# Patient Record
Sex: MALE
[De-identification: names, ages, dates, MRNs, and addresses within clinical notes are randomized per-mention and may not be internally consistent; named-entity substitution may affect disease eponyms.]

---

## 2020-12-10 ENCOUNTER — APPOINTMENT (OUTPATIENT)
Dept: ENDOCRINOLOGY | Facility: CLINIC | Age: 65
End: 2020-12-10

## 2022-04-12 ENCOUNTER — APPOINTMENT (OUTPATIENT)
Dept: UROLOGY | Facility: CLINIC | Age: 67
End: 2022-04-12
Payer: MEDICARE

## 2022-04-12 VITALS
HEART RATE: 92 BPM | SYSTOLIC BLOOD PRESSURE: 107 MMHG | OXYGEN SATURATION: 96 % | TEMPERATURE: 98 F | DIASTOLIC BLOOD PRESSURE: 73 MMHG

## 2022-04-12 DIAGNOSIS — Z95.5 PRESENCE OF CORONARY ANGIOPLASTY IMPLANT AND GRAFT: ICD-10-CM

## 2022-04-12 PROCEDURE — 99204 OFFICE O/P NEW MOD 45 MIN: CPT

## 2022-04-12 RX ORDER — METOPROLOL TARTRATE 75 MG/1
TABLET, FILM COATED ORAL
Refills: 0 | Status: ACTIVE | COMMUNITY

## 2022-04-12 RX ORDER — CLOPIDOGREL 75 MG/1
TABLET, FILM COATED ORAL
Refills: 0 | Status: ACTIVE | COMMUNITY

## 2022-04-12 RX ORDER — ROSUVASTATIN CALCIUM 10 MG/1
10 TABLET, FILM COATED ORAL
Refills: 0 | Status: ACTIVE | COMMUNITY
Start: 2022-04-12

## 2022-04-21 PROBLEM — Z95.5 H/O HEART ARTERY STENT: Status: ACTIVE | Noted: 2022-04-12

## 2022-04-21 LAB
APPEARANCE: CLEAR
BACTERIA UR CULT: NORMAL
BACTERIA: NEGATIVE
BILIRUBIN URINE: NEGATIVE
BLOOD URINE: NEGATIVE
COLOR: NORMAL
GLUCOSE QUALITATIVE U: ABNORMAL
HYALINE CASTS: 0 /LPF
KETONES URINE: NEGATIVE
LEUKOCYTE ESTERASE URINE: NEGATIVE
MICROSCOPIC-UA: NORMAL
NITRITE URINE: NEGATIVE
PH URINE: 6
PROTEIN URINE: ABNORMAL
PSA SERPL-MCNC: 1.65 NG/ML
RED BLOOD CELLS URINE: 0 /HPF
SPECIFIC GRAVITY URINE: 1.03
SQUAMOUS EPITHELIAL CELLS: 0 /HPF
UROBILINOGEN URINE: NORMAL
WHITE BLOOD CELLS URINE: 0 /HPF

## 2022-04-21 NOTE — ADDENDUM
[FreeTextEntry1] : I, Naveed Merrill, solely acted as scribe for Dr. Wolfgang Carrillo on 04/12/2022.

## 2022-04-21 NOTE — LETTER BODY
[Dear  ___] : Dear  [unfilled], [Consult Letter:] : I had the pleasure of evaluating your patient, [unfilled]. [Please see my note below.] : Please see my note below. [Consult Closing:] : Thank you very much for allowing me to participate in the care of this patient.  If you have any questions, please do not hesitate to contact me. [Sincerely,] : Sincerely, [FreeTextEntry1] : see note\par await labs\par \par trial of nystatin [FreeTextEntry3] : Wolfgang Carrillo MD FACS\par \par Attending Urologist-[facility]\par Chief-Urology Division [facility]\par Associate Clinical Professor-[employer] [facility] [employer] at [facility]\par \par

## 2022-04-21 NOTE — END OF VISIT
[FreeTextEntry3] : Medical record entries made by the scribe today, were at my direction and personally dictated to them by me, Dr. Wolfgang Carrillo on 04/12/2022. I have reviewed the chart and agree that the record accurately reflects my personal performance of the history, physical exam, assessment, and plan.

## 2022-04-21 NOTE — PHYSICAL EXAM
[General Appearance - Well Developed] : well developed [General Appearance - Well Nourished] : well nourished [Normal Appearance] : normal appearance [Well Groomed] : well groomed [General Appearance - In No Acute Distress] : no acute distress [Edema] : no peripheral edema [Respiration, Rhythm And Depth] : normal respiratory rhythm and effort [Exaggerated Use Of Accessory Muscles For Inspiration] : no accessory muscle use [Abdomen Soft] : soft [Abdomen Tenderness] : non-tender [Costovertebral Angle Tenderness] : no ~M costovertebral angle tenderness [Urethral Meatus] : meatus normal [Scrotum] : the scrotum was normal [No Prostate Nodules] : no prostate nodules [Testes Mass (___cm)] : there were no testicular masses [Normal Station and Gait] : the gait and station were normal for the patient's age [] : no rash [No Focal Deficits] : no focal deficits [Oriented To Time, Place, And Person] : oriented to person, place, and time [Affect] : the affect was normal [Mood] : the mood was normal [Not Anxious] : not anxious [No Palpable Adenopathy] : no palpable adenopathy [Epididymis] : the epididymides were normal [Testes Tenderness] : no tenderness of the testes [Anus Abnormality] : the anus and perineum were normal [Rectal Exam - Rectum] : digital rectal exam was normal [Prostate Tenderness] : the prostate was not tender [Prostate Size ___ (0-4)] : prostate size [unfilled] (scale: 0-4) [FreeTextEntry1] : balanitis

## 2022-04-21 NOTE — HISTORY OF PRESENT ILLNESS
[FreeTextEntry1] : 66 year old male presents today for c/o frequency, urgency, with mild urge incontinence, nocturia 4-5x, incomplete emptying and weak stream \par \par also c/o of a penile rash that causes his penis to appear bright red with some "skin falling off it" \par \par IPSS today: 17 \par \par denies dysuria and hematuria

## 2022-04-21 NOTE — ASSESSMENT
[FreeTextEntry1] : 66 year old male with LUTS, groin rash, and balanitis \par \par PVR today: 2 cc \par \par UA micro, culture, and PSA sent today\par \par to start on nystatin cream \par \par f/u 6-8 weeks for symptom reassessment \par

## 2022-04-21 NOTE — REVIEW OF SYSTEMS
[Eyesight Problems] : eyesight problems [Dry Eyes] : dryness of the eyes [Urine Infection (bladder/kidney)] : bladder/kidney infection [Strong urge to urinate] : strong urge to urinate [Bladder pressure] : experiences bladder pressure [Bladder fullness after urinating] : bladder fullness after urinating [Skin Lesions] : skin lesion [Skin Wound] : skin wound [Itching] : itching [Depression] : depression [Muscle Weakness] : muscle weakness [Negative] : Heme/Lymph [see HPI] : see HPI [FreeTextEntry3] : Strain urge to urinate causing leak of urine , dribble of urine

## 2022-04-22 ENCOUNTER — NON-APPOINTMENT (OUTPATIENT)
Age: 67
End: 2022-04-22

## 2022-06-06 ENCOUNTER — APPOINTMENT (OUTPATIENT)
Dept: UROLOGY | Facility: CLINIC | Age: 67
End: 2022-06-06
Payer: MEDICARE

## 2022-06-06 VITALS
SYSTOLIC BLOOD PRESSURE: 131 MMHG | TEMPERATURE: 97.8 F | HEART RATE: 71 BPM | DIASTOLIC BLOOD PRESSURE: 66 MMHG | OXYGEN SATURATION: 98 % | WEIGHT: 210 LBS

## 2022-06-06 DIAGNOSIS — N48.1 BALANITIS: ICD-10-CM

## 2022-06-06 DIAGNOSIS — R39.9 UNSPECIFIED SYMPTOMS AND SIGNS INVOLVING THE GENITOURINARY SYSTEM: ICD-10-CM

## 2022-06-06 PROCEDURE — 99213 OFFICE O/P EST LOW 20 MIN: CPT

## 2022-06-06 RX ORDER — NYSTATIN 100000 [USP'U]/G
100000 CREAM TOPICAL 3 TIMES DAILY
Qty: 1 | Refills: 3 | Status: ACTIVE | COMMUNITY
Start: 2022-04-12 | End: 1900-01-01

## 2022-06-06 RX ORDER — DULAGLUTIDE 0.75 MG/.5ML
0.75 INJECTION, SOLUTION SUBCUTANEOUS
Qty: 2 | Refills: 0 | Status: ACTIVE | COMMUNITY
Start: 2022-04-20

## 2022-06-06 NOTE — PHYSICAL EXAM
[General Appearance - Well Developed] : well developed [General Appearance - Well Nourished] : well nourished [Normal Appearance] : normal appearance [Well Groomed] : well groomed [General Appearance - In No Acute Distress] : no acute distress [Abdomen Soft] : soft [Abdomen Tenderness] : non-tender [Costovertebral Angle Tenderness] : no ~M costovertebral angle tenderness [Penis Abnormality] : normal uncircumcised penis [Scrotum] : the scrotum was normal [Epididymis] : the epididymides were normal [Testes Tenderness] : no tenderness of the testes [Testes Mass (___cm)] : there were no testicular masses [FreeTextEntry1] : mild minimal residual balanitis\par mild min residual balanitis

## 2022-06-06 NOTE — LETTER BODY
[Dear  ___] : Dear  [unfilled], [Courtesy Letter:] : I had the pleasure of seeing your patient, [unfilled], in my office today. [Please see my note below.] : Please see my note below. [Sincerely,] : Sincerely, [FreeTextEntry1] : see below [FreeTextEntry3] : Wolfgang Carrillo MD FACS\par \par Attending Urologist-[facility]\par Chief-Urology Division [facility]\par Associate Clinical Professor-[facility] [employer] at [facility]\par \par

## 2022-06-06 NOTE — ASSESSMENT
[FreeTextEntry1] : 66 year old male with LUTS, residual balanitis\par suggested that he consider consulting with an endocrinologist for better control\par urinary symptoms will likely improve with control of DM though may be partially related to BPH as well as glycosuria\par hold on additional LUTS meds\par \par \par nystatin refilled today\par also consider medicated powders and emphasized hygein and maintaining to keep dry \par \par follow up with endo\par Follow up in 3 months to re eval

## 2022-06-06 NOTE — ADDENDUM
[FreeTextEntry1] : I, Yadira Garcia, solely acted as scribe for Dr. Wolfgang Carrillo on 06/06/2022.

## 2022-06-06 NOTE — END OF VISIT
[FreeTextEntry3] : Medical record entries made by the scribe today, were at my direction and personally dictated to them by me, Dr. Wolfgang Carrillo on 06/06/2022. I have reviewed the chart and agree that the record accurately reflects my personal performance of the history, physical exam, assessment, and plan.

## 2022-06-06 NOTE — REVIEW OF SYSTEMS
"8/19/2020      RE: Heather Brown IV  [address], [place] [address]       Dear Colleague,    Thank you for the opportunity to participate in the care of your patient, Heather Brown IV, at the [facility] at [facility]. Please see a copy of my visit note below.    Electrophysiology Clinic Video Virtual Visit    Heather Brown IV is a 16 year old male who is being evaluated via a billable video visit.      The patient has been notified of following:     \"This video visit will be conducted via a call between you and your physician/provider. We have found that certain health care needs can be provided without the need for an in-person physical exam.  This service lets us provide the care you need with a video conversation.  If a prescription is necessary we can send it directly to your pharmacy.  If lab work is needed we can place an order for that and you can then stop by our lab to have the test done at a later time.    If during the course of the call the physician/provider feels a video visit is not appropriate, you will not be charged for this service.\"     Physician/provider has received verbal consent for a Video Visit from the patient? Yes    Please send link to email: [email or web address]      HPI:   Mr. 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 Garcia), and ablation 8/20/19 (Dr. Savage). He presents today for follow up.      He has had known history of WPW for last several years. He has had multiple episodes of tachycardia in the past. These are characterized by a sudden onset sensation of tachycardia with some wooziness. The first two episodes occurred at ages 8-9 and 12-13, and occurred while running/exercising, and stopped spontaneously after a brief period. He eventually was found to have WPW on his ECG. He underwent EPS with Dr. Garcia on " 05/15/17 at which time he was noted to have a parahisian / anteroseptal accessory pathway with APERP 280 ms and the decision was made not to ablate it. Since then he has had occasional breakthrough episodes with a more symptomatic episode in early 2019 and decision was made to pursue ablation. On 4/11/2019 an EPS was performed at which time an anteroseptal AP was ablated.  This pathway has since demonstrated recurance with episodes of tachycardia since the ablation. He was then referred to Dr. Savage for another ablation attempt. He underwent an ablation of anteroseptal accessory pathway ablation on 8/2019.      He called today reporting weeping area on back of his head since ablation. We asked him to come in for evaluation. He has a quarter sized area on the back of his head that is tender, slightly swollen/raised, and has crusted serous fluid on it. No redness, purulent drainage, or significant swelling. He states he noticed that he had a tender area on the back of his head after the ablation. He did not report this to us prior to leaving the hospital after ablation. He states that it was just a little tender and felt raised, but he could not see it well because it was on the back of his head. Just today he noticed that it was oozing. This appears to be a small blister that opened up and is now crusted over. He otherwise reports feeling some small pressure points/numbness in his heels. On exam they are normal without redness, swelling, discoloration, or any changes. He otherwise has been feeling well. Groin sites CDI, no hematoma, no bleeding. He denies any chest pain/pressures, dizziness, lightheadedness, worsening shortness of breath, leg/ankle swelling, PND, orthopnea, palpitations, or syncopal symptoms. Presenting 12 lead ECG shows SB Vent Rate 55 bpm,  ms,  ms, QTc 415 ms. No current cardiac medications.     EP visit 10/16/19: Since his last visit, has has three episodes of palpitations, SOB,  lightheadedness. First episode occurred in Med September during a football game, patient experienced lightheadedness, dizziness and palpitations for ten minutes. The next episode happened a week later, after patient ingested a caffeinated beverage. Third episode occurred late September, episode lasted for 30 min. Patient underwent stress test on 10/11/17 did revealed VT shortening as stress test advanced. EKG: NSR, VT:58, concern for WPW. No current cardiac medications.   After discussion with patient, he was more reluctant to pursue repeat ablation due to developing a pressure blister and numbness in his bilateral heels. Discussed other options such as flecanide or propafenone. We elected to start low dose Flecainide at this visit.     EP Visit 4/22/20: He is following up today. He reports feeling well. He took Flecainide for about 1.5 months but felt fogginess and attention issue on it and stopped the medications. He has had 3 episodes of palpitations since last visit. One in 11/2019 lasting 30 minutes, 1/2020 lasting 45 minute, and on in 2/2020 lasting 2 minutes.He states his heel and back of his head recovered. He denies any chest pain/pressures, dizziness, lightheadedness, worsening shortness of breath, leg/ankle swelling, PND, orthopnea, or syncopal symptoms.  No current cardiac medications.     He is following up today. He started Sotalol end of 5/2020. He reports feeling OK. He has some fatigue, headaches, and blurry vision with the Sotalol. He does not feel the Sotalol has impacted his symptoms much. He has had 3 SVT episodes since last visit lasting 10 min, 5 min, and 30 min. He denies any chest pain/pressures,  worsening shortness of breath, leg/ankle swelling, PND, orthopnea, or syncopal symptoms. Current cardiac medication include: Sotalol.       PAST MEDICAL HISTORY:  Past Medical History:   Diagnosis Date     Arrhythmia     SVT       CURRENT MEDICATIONS:  Current Outpatient Medications   Medication  Sig Dispense Refill     ibuprofen (ADVIL/MOTRIN) 200 MG tablet Take 200 mg by mouth every 4 hours as needed for mild pain       sotalol (BETAPACE) 80 MG tablet Take 1 tablet (80 mg) by mouth 2 times daily 60 tablet 11       PAST SURGICAL HISTORY:  Past Surgical History:   Procedure Laterality Date     EP ABLATION CHILD N/A 5/15/2017    Procedure: EP ABLATION CHILD;;  Surgeon: Mari Garcia MD;  Location: [facility] OR     EP STUDY CHILD N/A 5/15/2017    Procedure: EP STUDY CHILD;  EP Study and EP Ablation;  Surgeon: Mari Garcia MD;  Location: [facility] OR     EP WPW ABLATION N/A 4/11/2019    Procedure: EP WPW Ablation with Jerrod aponte as well.;  Surgeon: Mari Garcia MD;  Location:  HEART PEDS CARDIAC CATH LAB     EP WPW ABLATION N/A 8/20/2019    Procedure: EP WPW ABLATION;  Surgeon: Jerrod Savage MD;  Location:  HEART CARDIAC CATH LAB     ORTHOPEDIC SURGERY      closed reduction right wrist       ALLERGIES:   No Known Allergies    FAMILY HISTORY:  No family history on file.    SOCIAL HISTORY:  Social History     Tobacco Use     Smoking status: Never Smoker     Smokeless tobacco: Never Used   Substance Use Topics     Alcohol use: No     Drug use: Never       ROS:  10 point ROS neg other than the symptoms noted above in the HPI.    Exam:  The rest of a comprehensive physical examination is deferred due to public health emergency video visit restrictions.  CONSITUTIONAL: no acute distress  HEENT: no icterus, no redness or discharge, neck supple  CV: no visible edema of visualized extremities. No JVD.   RESPIRATORY: respirations nonlabored, no cough  NEURO: AA&Ox3, speech fluent/appropriate, motor grossly nonfocal  PSYCH: cooperative, affect appropriate  DERM: no rashes on visualized face/neck/upper extremities\    Labs:  Reviewed.     Testing/Procedures:  4/2019 ECHOCARDIOGRAM:   History of Mccracken-Parkinson-White syndrome, s/p ablation. Mildly dilated left  ventricle Z-score 2.0 with low normal  systolic function; biplane LV EF 48%.  Trivial tricuspid regurgitation with normal estimated right heart systolic  pressure. No pericardial effusion.      Assessment and Plan:   Mr. 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 Garcia), and ablation 8/20/19 (Dr. Savage). He is following up today. He started Sotalol end of 5/2020. He reports feeling OK. He has some fatigue, headaches, and blurry vision with the Sotalol. He does not feel the Sotalol has impacted his symptoms much. He has had 3 SVT episodes since last visit lasting 10 min, 5 min, and 30 min. He denies any chest pain/pressures,  worsening shortness of breath, leg/ankle swelling, PND, orthopnea, or syncopal symptoms. Current cardiac medication include: Sotalol.      WPW:    We discussed WPW in detail with patient including implications and management. He has had 2 ablations. Last ablation he had later recurrence a couple weeks after ablation. He was having some intermittent SVT symptoms and was placed on Flecainide at the last visit. He had side effects with Flecainide and stopped the medication. He was then placed on Sotalol, but is reporting that it has not impacted his symptoms and he has fatigue, headache, and blurry vision with the mediation. We will have him wean off Sotalol, decreasing to 40 mg BID for 2 weeks and then stop. We discussed we could consider alternative AAT or repeat ablation. He does not want to be on chronic meds if possible. He would like to have conservative approach. We also counseled her on avoidance of AVN blocking agents until his WPW is treated.     Follow up in 6 months.     Video-Visit Details    Type of service:  Video Visit    Video Visit Duration: 11 minutes.    Originating Location (pt. Location): Home    Distant Location (provider location):  [facility]     Mode of Communication:  Video Conference via [employer]    I have  "reviewed the note as documented above.  This accurately captures the substance of my virtual visit with the patient. The patient states understanding and is agreeable with the plan.     Jerrod Savage MD [facility]  Cardiology - Electrophysiology          Heather Brown IV is a 16 year old male who is being evaluated via a billable video visit.      The patient has been notified of following:     \"This video visit will be conducted via a call between you and your physician/provider. We have found that certain health care needs can be provided without the need for an in-person physical exam.  This service lets us provide the care you need with a video conversation.  If a prescription is necessary we can send it directly to your pharmacy.  If lab work is needed we can place an order for that and you can then stop by our lab to have the test done at a later time.    Video visits are billed at different rates depending on your insurance coverage.  Please reach out to your insurance provider with any questions.    If during the course of the call the physician/provider feels a video visit is not appropriate, you will not be charged for this service.\"    Patient has given verbal consent for Video visit? Yes  How would you like to obtain your AVS? Mail a copy  If you are dropped from the video visit, the video invite should be resent to: Text to cell phone: [phone]  Will anyone else be joining your video visit? No      " [see HPI] : see HPI [Negative] : Heme/Lymph

## 2022-06-06 NOTE — HISTORY OF PRESENT ILLNESS
[FreeTextEntry1] : 66 year old male following up for LUTS\par still c/o frequency. symptoms are the same\par patient notes that he drinks "a lot of tea"\par \par DM is poorly controlled tho A1c is lower per patient\par  A1c reportedly down to ~7 from 10\par \par also following up for balanitis\par used nystatin- improved but not completely resolved

## 2022-09-12 ENCOUNTER — APPOINTMENT (OUTPATIENT)
Dept: UROLOGY | Facility: CLINIC | Age: 67
End: 2022-09-12